# Patient Record
Sex: FEMALE | Race: BLACK OR AFRICAN AMERICAN | Employment: STUDENT | URBAN - METROPOLITAN AREA
[De-identification: names, ages, dates, MRNs, and addresses within clinical notes are randomized per-mention and may not be internally consistent; named-entity substitution may affect disease eponyms.]

---

## 2019-07-01 ENCOUNTER — OFFICE VISIT (OUTPATIENT)
Dept: OBGYN CLINIC | Age: 29
End: 2019-07-01

## 2019-07-01 ENCOUNTER — TELEPHONE (OUTPATIENT)
Dept: OBGYN CLINIC | Age: 29
End: 2019-07-01

## 2019-07-01 VITALS
RESPIRATION RATE: 20 BRPM | BODY MASS INDEX: 23.4 KG/M2 | SYSTOLIC BLOOD PRESSURE: 121 MMHG | OXYGEN SATURATION: 100 % | WEIGHT: 154.4 LBS | HEART RATE: 94 BPM | TEMPERATURE: 98 F | DIASTOLIC BLOOD PRESSURE: 81 MMHG | HEIGHT: 68 IN

## 2019-07-01 DIAGNOSIS — Z30.09 FAMILY PLANNING: Primary | ICD-10-CM

## 2019-07-01 LAB
HCG URINE, QL. (POC): NEGATIVE
VALID INTERNAL CONTROL?: YES

## 2019-07-01 RX ORDER — MEDROXYPROGESTERONE ACETATE 150 MG/ML
150 INJECTION, SUSPENSION INTRAMUSCULAR ONCE
Qty: 1 SYRINGE | Refills: 1
Start: 2019-07-01 | End: 2019-07-01

## 2019-07-01 RX ORDER — MEDROXYPROGESTERONE ACETATE 150 MG/ML
150 INJECTION, SUSPENSION INTRAMUSCULAR ONCE
Qty: 1 SYRINGE | Refills: 1
Start: 2019-07-01 | End: 2019-07-01 | Stop reason: SDUPTHER

## 2019-07-01 NOTE — PROGRESS NOTES
1. Have you been to the ER, urgent care clinic since your last visit? Hospitalized since your last visit? No    2. Have you seen or consulted any other health care providers outside of the [address] [place] [address] since your last visit? Include any pap smears or colon screening.  No

## 2019-07-01 NOTE — PROGRESS NOTES
Subjective:   Xiomy Johnson is a 25 y.o. female who presents for contraception counseling. The patient has no complaints today. She was previously on the Nexplanon and reports significant mood swings. She had the device removed and was placed on OCPs. She reports continued mood swings on the pills and would like to switch to depo provera. She was on depo provera approximately 1 year ago, but got pregnant on the medication. She reports skipping one month in between the time when she was supposed to have her next injection and receiving her injection adding an additional month (4 months) in between her doses instead of the 3 recommended. There is no problem list on file for this patient. There are no active problems to display for this patient. Current Outpatient Medications   Medication Sig Dispense Refill    medroxyPROGESTERone (DEPO-PROVERA) 150 mg/mL syrg 1 mL by IntraMUSCular route once for 1 dose. 1 Syringe 1    PRENATAL VITS W-CA,FE,FA,<1MG, (PRENATAL VITAMIN PO) Take  by mouth. Allergies   Allergen Reactions    Sulfa (Sulfonamide Antibiotics) Unknown (comments)     History reviewed. No pertinent past medical history. Past Surgical History:   Procedure Laterality Date    HX  SECTION       History reviewed. No pertinent family history. Social History     Tobacco Use    Smoking status: Never Smoker    Smokeless tobacco: Never Used   Substance Use Topics    Alcohol use: No        GYN Review: normal menses, no abnormal bleeding, pelvic pain or discharge, no breast pain or new or enlarging lumps on self exam    Additional Review of Systems  Pertinent items are noted in HPI. Objective:     Visit Vitals  /81   Pulse 94   Temp 98 °F (36.7 °C) (Oral)   Resp 20   Ht 5' 8\" (1.727 m)   Wt 154 lb 6.4 oz (70 kg)   LMP 2019   SpO2 100%   BMI 23.48 kg/m²     The patient appears well, alert, oriented x 3, in no distress.     Assessment/Plan:     25y.o. year old, starting Depo-Provera injections, no contraindications. counseled on family planning choices. Patient also counseled on the correct instructions for how to take the medication and that it is NOT advised to extend her dosages beyond the 3 months that are recommended. She was advised that this will increase her risk of getting pregnant and that she should NOT come up with her own regimen for any form of contraception to which she expressed understanding. ICD-10-CM ICD-9-CM    1. Family planning Z30.09 V25.09 AMB POC URINE PREGNANCY TEST, VISUAL COLOR COMPARISON      medroxyPROGESTERone (DEPO-PROVERA) 150 mg/mL syrg     Encounter Diagnoses   Name Primary?  Family planning Yes     Orders Placed This Encounter    AMB POC URINE PREGNANCY TEST, VISUAL COLOR COMPARISON    medroxyPROGESTERone (DEPO-PROVERA) 150 mg/mL syrg     Follow-up and Dispositions    · Return in about 4 months (around 11/1/2019) for Annual Exam or prn. The entire visit with Ms. Endy Carranza took 20 minutes. Over 50% of this visit was spent counseling the patient regarding her concerns. All questions were answered to her satisfaction.  She will follow-up in November for her Annual Exam.

## 2019-07-01 NOTE — PROGRESS NOTES
Patient  Here to receive Medroxy-progesterone 150 mg/ml    Verbal order obtained from [facility]   Medication check witness by Danisha Smith LPN,  2 identifiers checked  by both nurses   Visit Vitals  /81   Pulse 94   Temp 98 °F (36.7 °C) (Oral)   Resp 20   Ht 5' 8\" (1.727 m)   Wt 154 lb 6.4 oz (70 kg)   LMP 07/01/2019   SpO2 100%   BMI 23.48 kg/m²     Urine pregnancy test negative Depo  Depo Provera  150 mg /1 mL   Was place in the Right Gluteus   LOT # [id number] expires 04/01/21  Patient observed for 15 minutes and discharge home in stable condition  Discharge instructions given to follow up in office in 90 days for her next injection

## 2019-10-10 ENCOUNTER — CLINICAL SUPPORT (OUTPATIENT)
Dept: OBGYN CLINIC | Age: 29
End: 2019-10-10

## 2019-10-10 VITALS
BODY MASS INDEX: 23.64 KG/M2 | OXYGEN SATURATION: 99 % | SYSTOLIC BLOOD PRESSURE: 111 MMHG | DIASTOLIC BLOOD PRESSURE: 78 MMHG | HEART RATE: 80 BPM | RESPIRATION RATE: 20 BRPM | HEIGHT: 68 IN | WEIGHT: 156 LBS

## 2019-10-10 DIAGNOSIS — Z30.09 FAMILY PLANNING: Primary | ICD-10-CM

## 2019-10-10 LAB
HCG URINE, QL. (POC): NEGATIVE
VALID INTERNAL CONTROL?: YES

## 2019-10-10 RX ORDER — MEDROXYPROGESTERONE ACETATE 150 MG/ML
150 INJECTION, SUSPENSION INTRAMUSCULAR ONCE
Qty: 1 SYRINGE | Refills: 0
Start: 2019-10-10 | End: 2019-10-10

## 2019-10-10 NOTE — PROGRESS NOTES
Date last pap: is due ASAP patient awareLast Depo-Provera: 07/01/19  Side Effects if any: none. Serum HCG indicated? UPT NEGATIVE. Depo-Provera 150 mg IM given by: Alejandro garcia  Next appointment due 12/2019.

## 2020-01-24 ENCOUNTER — HOSPITAL ENCOUNTER (OUTPATIENT)
Dept: LAB | Age: 30
Discharge: HOME | End: 2020-01-24
Payer: COMMERCIAL

## 2020-01-24 ENCOUNTER — OFFICE VISIT (OUTPATIENT)
Dept: OBGYN CLINIC | Age: 30
End: 2020-01-24

## 2020-01-24 VITALS
DIASTOLIC BLOOD PRESSURE: 77 MMHG | RESPIRATION RATE: 18 BRPM | HEIGHT: 68 IN | WEIGHT: 145 LBS | HEART RATE: 98 BPM | BODY MASS INDEX: 21.98 KG/M2 | SYSTOLIC BLOOD PRESSURE: 106 MMHG

## 2020-01-24 DIAGNOSIS — Z11.3 ROUTINE SCREENING FOR STI (SEXUALLY TRANSMITTED INFECTION): ICD-10-CM

## 2020-01-24 DIAGNOSIS — Z01.419 WELL WOMAN EXAM WITH ROUTINE GYNECOLOGICAL EXAM: ICD-10-CM

## 2020-01-24 DIAGNOSIS — Z30.09 FAMILY PLANNING: Primary | ICD-10-CM

## 2020-01-24 LAB
HCG URINE, QL. (POC): NEGATIVE
VALID INTERNAL CONTROL?: YES

## 2020-01-24 PROCEDURE — 87624 HPV HI-RISK TYP POOLED RSLT: CPT

## 2020-01-24 PROCEDURE — 87661 TRICHOMONAS VAGINALIS AMPLIF: CPT

## 2020-01-24 PROCEDURE — 87389 HIV-1 AG W/HIV-1&-2 AB AG IA: CPT

## 2020-01-24 PROCEDURE — 88175 CYTOPATH C/V AUTO FLUID REDO: CPT

## 2020-01-24 PROCEDURE — 86780 TREPONEMA PALLIDUM: CPT

## 2020-01-24 PROCEDURE — 36415 COLL VENOUS BLD VENIPUNCTURE: CPT

## 2020-01-24 PROCEDURE — 86803 HEPATITIS C AB TEST: CPT

## 2020-01-24 PROCEDURE — 87340 HEPATITIS B SURFACE AG IA: CPT

## 2020-01-24 RX ORDER — MEDROXYPROGESTERONE ACETATE 150 MG/ML
150 INJECTION, SUSPENSION INTRAMUSCULAR ONCE
COMMUNITY
End: 2020-01-24 | Stop reason: SDUPTHER

## 2020-01-24 RX ORDER — MEDROXYPROGESTERONE ACETATE 150 MG/ML
150 INJECTION, SUSPENSION INTRAMUSCULAR ONCE
Qty: 1 SYRINGE | Refills: 3
Start: 2020-01-24 | End: 2020-01-24

## 2020-01-24 NOTE — PROGRESS NOTES
Subjective:   30 y.o. female for Well Woman Check. She desires to continue depo provera for contraception. No LMP recorded. Social History: single partner, contraception - Depo-Provera injections. Pertinent past medical hstory: no history of HTN, DVT, CAD, DM, liver disease, migraines or smoking. There is no problem list on file for this patient. There are no active problems to display for this patient. Current Outpatient Medications   Medication Sig Dispense Refill    medroxyPROGESTERone (DEPO-PROVERA) 150 mg/mL syrg 1 mL by IntraMUSCular route once for 1 dose. 1 Syringe 3    PRENATAL VITS W-CA,FE,FA,<1MG, (PRENATAL VITAMIN PO) Take  by mouth. Allergies   Allergen Reactions    Sulfa (Sulfonamide Antibiotics) Unknown (comments)     History reviewed. No pertinent past medical history. Past Surgical History:   Procedure Laterality Date    HX  SECTION       History reviewed. No pertinent family history. Social History     Tobacco Use    Smoking status: Never Smoker    Smokeless tobacco: Never Used   Substance Use Topics    Alcohol use: No        ROS:  Feeling well. No dyspnea or chest pain on exertion. No abdominal pain, change in bowel habits, black or bloody stools. No urinary tract symptoms. GYN ROS: no breast pain or new or enlarging lumps on self exam. No neurological complaints. Objective:     Visit Vitals  /77   Pulse 98   Resp 18   Ht 5' 8\" (1.727 m)   Wt 145 lb (65.8 kg)   BMI 22.05 kg/m²     The patient appears well, alert, oriented x 3, in no distress. ENT normal.  Neck supple. No adenopathy or thyromegaly. HUI. Lungs are clear, good air entry, no wheezes, rhonchi or rales. S1 and S2 normal, no murmurs, regular rate and rhythm. Abdomen soft without tenderness, guarding, mass or organomegaly. Extremities show no edema, normal peripheral pulses. Neurological is normal, no focal findings.     BREAST EXAM: breasts appear normal, no suspicious masses, no skin or nipple changes or axillary nodes    PELVIC EXAM: normal external genitalia, vulva, vagina, cervix, uterus and adnexa    Assessment/Plan:   well woman  no contraindication to continue hormonal therapy  pap smear  counseled on breast self exam  return annually or prn    ICD-10-CM ICD-9-CM    1. Well woman exam with routine gynecological exam Z01.419 V72.31 medroxyPROGESTERone (DEPO-PROVERA) 150 mg/mL syrg      T PALLIDUM AB      HEP B SURFACE AG      HEPATITIS C AB      HIV 1/2 AG/AB, 4TH GENERATION,W RFLX CONFIRM      PAP IG, CT-NG TV HPV 16&18,45 ([id number], [id number])   2. Routine screening for STI (sexually transmitted infection) Z11.3 V74.5 medroxyPROGESTERone (DEPO-PROVERA) 150 mg/mL syrg      T PALLIDUM AB      HEP B SURFACE AG      HEPATITIS C AB      HIV 1/2 AG/AB, 4TH GENERATION,W RFLX CONFIRM      PAP IG, CT-NG TV HPV 16&18,45 ([id number], [id number])   3. Family planning Z30.09 V25.09 medroxyPROGESTERone (DEPO-PROVERA) 150 mg/mL syrg     Encounter Diagnoses   Name Primary?  Well woman exam with routine gynecological exam Yes    Routine screening for STI (sexually transmitted infection)     Family planning      Orders Placed This Encounter    T PALLIDUM AB    HEP B SURFACE AG    HEPATITIS C AB    HIV 1/2 AG/AB, 4TH GENERATION,W RFLX CONFIRM    DISCONTD: medroxyPROGESTERone (DEPO-PROVERA) 150 mg/mL syrg    medroxyPROGESTERone (DEPO-PROVERA) 150 mg/mL syrg    PAP IG, CT-NG TV HPV 16&18,45 ([id number], [id number])     Follow-up and Dispositions    · Return in about 1 year (around 1/24/2021), or if symptoms worsen or fail to improve, for Annual Exam or prn. Joey Mckeon

## 2020-01-24 NOTE — PROGRESS NOTES
Patient  Here to receive Medroxy-progesterone 150 mg/ml    Verbal order obtained from ,  Medication check witness by oscar haines LPN,  2 identifiers checked  by both nurses   Visit Vitals  /77   Pulse 98   Resp 18   Ht 5' 8\" (1.727 m)   Wt 145 lb (65.8 kg)   BMI 22.05 kg/m²     Urine pregnancy test negative Depo  Depo Provera  150 mg /1 mL   Was place in the  Right gluteus   LOT #  [id number] expires 12/01/21  Patient observed for 15 minutes and discharge home in stable condition  Discharge instructions given to follow up in office in 90 days for her next injection

## 2020-01-27 LAB
C TRACH RRNA SPEC QL NAA+PROBE: NEGATIVE
HBV SURFACE AG SER QL: <0.1 INDEX
HBV SURFACE AG SER QL: NEGATIVE
HCV AB SER IA-ACNC: 0.04 INDEX
HCV AB SERPL QL IA: NEGATIVE
HCV COMMENT,HCGAC: NORMAL
HIV 1+2 AB+HIV1 P24 AG SERPL QL IA: NONREACTIVE
HIV12 RESULT COMMENT, HHIVC: NORMAL
N GONORRHOEA RRNA SPEC QL NAA+PROBE: NEGATIVE
SPECIMEN SOURCE: NORMAL
T PALLIDUM AB SER QL IA: NONREACTIVE
T VAGINALIS RRNA SPEC QL NAA+PROBE: NEGATIVE

## 2020-04-23 ENCOUNTER — CLINICAL SUPPORT (OUTPATIENT)
Dept: OBGYN CLINIC | Age: 30
End: 2020-04-23

## 2020-04-23 VITALS
BODY MASS INDEX: 24.25 KG/M2 | WEIGHT: 160 LBS | HEART RATE: 90 BPM | HEIGHT: 68 IN | SYSTOLIC BLOOD PRESSURE: 124 MMHG | DIASTOLIC BLOOD PRESSURE: 87 MMHG

## 2020-04-23 DIAGNOSIS — Z30.09 FAMILY PLANNING: Primary | ICD-10-CM

## 2020-04-23 LAB
HCG URINE, QL. (POC): NEGATIVE
VALID INTERNAL CONTROL?: YES

## 2020-04-23 NOTE — PROGRESS NOTES
Patient  Here to receive Medroxy-progesterone 150 mg/ml    Verbal order obtained from [facility]  Medication check witness by Ghassan Leslie,  2 identifiers checked  by both nurses   Urine pregnancy test negative Depo  Depo Provera  150 mg /1 mL   Was place in the   Right gluteus   LOT # [id number] expires 02/01/22  Patient observed for 15 minutes and discharge home in stable condition  Discharge instructions given to follow up in office in 90 days for her next injection           Visit Vitals  /87   Pulse 90   Ht 5' 8\" (1.727 m)   Wt 160 lb (72.6 kg)   BMI 24.33 kg/m²

## 2020-07-23 ENCOUNTER — CLINICAL SUPPORT (OUTPATIENT)
Dept: OBGYN CLINIC | Age: 30
End: 2020-07-23

## 2020-07-23 VITALS
DIASTOLIC BLOOD PRESSURE: 76 MMHG | BODY MASS INDEX: 26.52 KG/M2 | HEIGHT: 68 IN | HEART RATE: 105 BPM | WEIGHT: 175 LBS | SYSTOLIC BLOOD PRESSURE: 132 MMHG

## 2020-07-23 DIAGNOSIS — Z30.09 FAMILY PLANNING: Primary | ICD-10-CM

## 2020-07-23 LAB
HCG URINE, QL. (POC): NEGATIVE
VALID INTERNAL CONTROL?: YES

## 2020-07-23 NOTE — PROGRESS NOTES
Patient  here today to her receive Medroxy-progesterone 150 mg/ml    Medication  Double checked and witness by Frances  2 identifiers checked  by both team members  Visit Vitals  /76   Pulse (!) 105   Ht 5' 8\" (1.727 m)   Wt 175 lb (79.4 kg)   BMI 26.61 kg/m²     Urine pregnancy test negative and charted . Depo Provera  150 mg /1 mL   Was place in the *    LOT # [id number] expires 04/01/22      Patient observed for 15 minutes and discharge home in stable condition. Discharge instructions given to follow up in office in 90 days for her next injection .

## 2020-07-27 ENCOUNTER — VIRTUAL VISIT (OUTPATIENT)
Dept: FAMILY MEDICINE CLINIC | Age: 30
End: 2020-07-27

## 2020-07-27 ENCOUNTER — TELEPHONE (OUTPATIENT)
Dept: FAMILY MEDICINE CLINIC | Age: 30
End: 2020-07-27

## 2020-07-27 DIAGNOSIS — F41.9 ANXIETY: Primary | ICD-10-CM

## 2020-07-27 RX ORDER — BUSPIRONE HYDROCHLORIDE 5 MG/1
5 TABLET ORAL
Qty: 90 TAB | Refills: 1 | Status: SHIPPED | OUTPATIENT
Start: 2020-07-27 | End: 2020-08-10

## 2020-07-27 RX ORDER — MEDROXYPROGESTERONE ACETATE 150 MG/ML
INJECTION, SUSPENSION INTRAMUSCULAR
COMMUNITY
Start: 2020-07-23

## 2020-07-27 NOTE — TELEPHONE ENCOUNTER
Patient is requesting to be off work until the [employer] on 07/30. Patient is requesting a note to cover her from 07/27-07/30. Call the patient back at [phone].

## 2020-07-27 NOTE — PATIENT INSTRUCTIONS
Learning About Mindfulness for Stress What are mindfulness and stress? Stress is what you feel when you have to handle more than you are used to. A lot of things can cause stress. You may feel stress when you go on a job interview, take a test, or run a race. This kind of short-term stress is normal and even useful. It can help you if you need to work hard or react quickly. Stress also can last a long time. Long-term stress is caused by stressful situations or events. Examples of long-term stress include long-term health problems, ongoing problems at work, and conflicts in your family. Long-term stress can harm your health. Mindfulness is a focus only on things happening in the present moment. It's a process of purposefully paying attention to and being aware of your surroundings, your emotions, your thoughts, and how your body feels. You are aware of these things, but you aren't judging these experiences as \"good\" or \"bad. \" Mindfulness can help you learn to calm your mind and body to help you cope with illness, pain, and stress. How does mindfulness help to relieve stress? Mindfulness can help quiet your mind and relax your body. Studies show that it can help some people sleep better, feel less anxious, and bring their blood pressure down. And it's been shown to help some people live and cope better with certain health problems like heart disease, depression, chronic pain, and cancer. How do you practice mindfulness? To be mindful is to pay attention, to be present, and to be accepting. · When you're mindful, you do just one thing and you pay close attention to that one thing. For example, you may sit quietly and notice your emotions or how your food tastes and smells. · When you're present, you focus on the things that are happening right now. You let go of your thoughts about the past and the future.  When you dwell on the past or the future, you miss moments that can heal and strengthen you. You may miss moments like hearing a child laugh or seeing a friendly face when you think you're all alone. · When you're accepting, you don't  the present moment. Instead you accept your thoughts and feelings as they come. You can practice anytime, anywhere, and in any way you choose. You can practice in many ways. Here are a few ideas: · While doing your chores, like washing the dishes, let your mind focus on what's in your hand. What does the dish feel like? Is the water warm or cold? · Go outside and take a few deep breaths. What is the air like? Is it warm or cold? · When you can, take some time at the start of your day to sit alone and think. · Take a slow walk by yourself. Count your steps while you breathe in and out. · Try yoga breathing exercises, stretches, and poses to strengthen and relax your muscles. · At work, if you can, try to stop for a few moments each hour. Note how your body feels. Let yourself regroup and let your mind settle before you return to what you were doing. · If you struggle with anxiety or \"worry thoughts,\" imagine your mind as a blue cm and your worry thoughts as clouds. Now imagine those worry thoughts floating across your mind's cm. Just let them pass by as you watch. Follow-up care is a key part of your treatment and safety. Be sure to make and go to all appointments, and call your doctor if you are having problems. It's also a good idea to know your test results and keep a list of the medicines you take. Where can you learn more? Go to http://prabhakar-ivette.info/ Enter V887 in the search box to learn more about \"Learning About Mindfulness for Stress. \" Current as of: December 16, 2019               Content Version: 12.5 © 9442-2535 [employer]wise[employer]orporated. Care instructions adapted under license by [employer] (which disclaims liability or warranty for this information).  If you have questions about a medical condition or this instruction, always ask your healthcare professional. [place] [address] any warranty or liability for your use of this information.

## 2020-07-27 NOTE — PROGRESS NOTES
Chief Complaint   Patient presents with    New Patient    Shortness of Breath     taking frequent breaks     Other     Excessive sweating            1. Have you been to the ER, urgent care clinic since your last visit? Hospitalized since your last visit? No    2. Have you seen or consulted any other health care providers outside of the [address] [place] [address] since your last visit? Include any pap smears or colon screening.  NO
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
## 2020-07-31 ENCOUNTER — TELEPHONE (OUTPATIENT)
Dept: FAMILY MEDICINE CLINIC | Age: 30
End: 2020-07-31

## 2020-07-31 NOTE — TELEPHONE ENCOUNTER
Patient called back again wanting to speak with the nurse regarding how her medication is making her feel

## 2020-08-03 ENCOUNTER — TELEPHONE (OUTPATIENT)
Dept: PRIMARY CARE CLINIC | Age: 30
End: 2020-08-03

## 2020-08-03 DIAGNOSIS — F43.10 PTSD (POST-TRAUMATIC STRESS DISORDER): ICD-10-CM

## 2020-08-03 DIAGNOSIS — F41.9 ANXIETY: Primary | ICD-10-CM

## 2020-08-03 RX ORDER — PRAZOSIN HYDROCHLORIDE 1 MG/1
1 CAPSULE ORAL
Qty: 30 CAP | Refills: 0 | Status: SHIPPED | OUTPATIENT
Start: 2020-08-03 | End: 2020-09-10
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
## 2020-08-03 NOTE — LETTER
NOTIFICATION RETURN TO WORK / SCHOOL 
 
8/3/2020 5:10 PM 
 
Ms. Juan Almonte [facility] [phone] To Whom It May Concern: 
 
Juan Almonte is currently under the care of Fiona Estes. She will return to work/school on: 8/8/2020. If there are questions or concerns please have the patient contact our office. Sincerely, Jake Hussein NP

## 2020-08-03 NOTE — TELEPHONE ENCOUNTER
Pt returned my call in regards medication is not making her feel well. She states that BELGICA Perez prescribed her a medication last week and is making her feel nauseous and is keeping her up at night. She is not sleeping well. She also states she has a claim for disability from work as well and is needing a work note- I asked her why is she on disability and pt stated that it is interfering with her performance at work. I informed  Her that all I can do is ask the provider what she would like to do now. Pt can be reached at [phone]. Thank you      Did talk to BELGICA Perez in this regard- she will call pt after her appt's this evening. I did inform pt of this. Pt understood.  Thanks

## 2020-08-04 ENCOUNTER — TELEPHONE (OUTPATIENT)
Dept: FAMILY MEDICINE CLINIC | Age: 30
End: 2020-08-04

## 2020-08-05 NOTE — TELEPHONE ENCOUNTER
Did call pt back and inform her that they are not taking new patients at this time- gave her a few other options to work with.  Thank you

## 2020-08-10 ENCOUNTER — TELEPHONE (OUTPATIENT)
Dept: PRIMARY CARE CLINIC | Age: 30
End: 2020-08-10

## 2020-08-10 ENCOUNTER — TELEPHONE (OUTPATIENT)
Dept: FAMILY MEDICINE CLINIC | Age: 30
End: 2020-08-10

## 2020-08-10 NOTE — TELEPHONE ENCOUNTER
Did notify pt, that PER NP Ace Blackmon will NOT fill out disability paperwork for pt to be out of work. She only saw pt once and the paperwork needs to be filled out by psychiatry. All this was PER NP Ace Blackmon- pt understood.  Thank you

## 2020-08-10 NOTE — TELEPHONE ENCOUNTER
The onset of the prazosin is 2 hours, she can try to take it earlier. If it is making her too sleepy in the day she can stop taking it and just wait for her psychiatry visit.

## 2020-08-10 NOTE — TELEPHONE ENCOUNTER
Patient called to state that her sleep medication is working during the daytime and that she as an appointment with a psychologist on 8/17/2020

## 2020-08-10 NOTE — TELEPHONE ENCOUNTER
Just got off the phone with this pt, she did not mention anything to me in this regard. About her sleep medication.

## 2020-08-11 LAB
ALB/GLOBRATIO, 58C: 1.6 (CALC) (ref 1–2.5)
ALBUMIN SERPL-MCNC: 4.7 G/DL (ref 3.6–5.1)
ALKALINE PHOSPHATASE, TOTAL, 25002000: 60 U/L (ref 31–125)
ALT SERPL-CCNC: 9 U/L (ref 6–29)
AST SERPL W P-5'-P-CCNC: 13 U/L (ref 10–30)
BILIRUB SERPL-MCNC: 0.4 MG/DL (ref 0.2–1.2)
BUN SERPL-MCNC: 9 MG/DL (ref 7–25)
BUN/CREATININE RATIO,BUCR: NORMAL (CALC) (ref 6–22)
CALCIUM SERPL-MCNC: 10.2 MG/DL (ref 8.6–10.2)
CHLORIDE SERPL-SCNC: 104 MMOL/L (ref 98–110)
CO2 SERPL-SCNC: 26 MMOL/L (ref 20–32)
CREAT SERPL-MCNC: 0.65 MG/DL (ref 0.5–1.1)
GLOBULIN,GLOB: 3 G/DL (CALC) (ref 1.9–3.7)
GLUCOSE SERPL-MCNC: 85 MG/DL (ref 65–99)
POTASSIUM SERPL-SCNC: 4.3 MMOL/L (ref 3.5–5.3)
PROT SERPL-MCNC: 7.7 G/DL (ref 6.1–8.1)
SODIUM SERPL-SCNC: 138 MMOL/L (ref 135–146)
T4 FREE SERPL-MCNC: 1.2 NG/DL (ref 0.8–1.8)
TSH SERPL DL<=0.005 MIU/L-ACNC: 0.75 MIU/L

## 2020-09-10 ENCOUNTER — VIRTUAL VISIT (OUTPATIENT)
Dept: FAMILY MEDICINE CLINIC | Age: 30
End: 2020-09-10

## 2020-09-10 DIAGNOSIS — F41.9 ANXIETY: Primary | ICD-10-CM

## 2020-09-10 RX ORDER — BUSPIRONE HYDROCHLORIDE 5 MG/1
5 TABLET ORAL
COMMUNITY
End: 2020-09-10

## 2020-09-10 RX ORDER — HYDROXYZINE PAMOATE 25 MG/1
CAPSULE ORAL
COMMUNITY
Start: 2020-09-09

## 2020-09-10 RX ORDER — BUPROPION HYDROCHLORIDE 150 MG/1
TABLET ORAL
COMMUNITY
Start: 2020-08-17

## 2020-09-10 RX ORDER — LURASIDONE HYDROCHLORIDE 20 MG/1
TABLET, FILM COATED ORAL
COMMUNITY
Start: 2020-09-08 | End: 2020-09-10

## 2020-09-10 RX ORDER — TRAZODONE HYDROCHLORIDE 50 MG/1
TABLET ORAL
COMMUNITY
Start: 2020-08-03 | End: 2020-09-10

## 2020-09-10 NOTE — PROGRESS NOTES
[address] [place] [address]      Myrtle Scruggs is a 30 y.o. female who was seen by synchronous (real-time) audio-video technology on 9/10/2020. Consent: Myrtle Scruggs, who was seen by synchronous (real-time) audio-video technology, and/or her healthcare decision maker, is aware that this patient-initiated, Telehealth encounter on 9/10/2020 is a billable service, with coverage as determined by her insurance carrier. She is aware that she may receive a bill and has provided verbal consent to proceed: Yes. Assessment & Plan:   Diagnoses and all orders for this visit:    1. Anxiety    paperwork completed for the dates of 7/23-8/8/2020 and faxed to the number provided    Follow-up and Dispositions    · Return if symptoms worsen or fail to improve. 712  Subjective:   Myrtle Scruggs is a 30 y.o. female who was seen for   Other (Paperwork FMLA)    [address] paperwork filled out  Was referred to [facility] and could not get an appointment  She is going to [place], being seen by SAMARA Daigle  She had medication changes and was placed out of work until next f/u 10/6/2020  Her work wants more information as to why she was out of work and returning on 8/8/2020    Prior to Admission medications    Medication Sig Start Date End Date Taking? Authorizing Provider   buPROPion XL (WELLBUTRIN XL) 150 mg tablet  8/17/20  Yes Provider, Historical   medroxyPROGESTERone (DEPO-PROVERA) 150 mg/mL injection  7/23/20  Yes Provider, Historical   hydrOXYzine pamoate (VISTARIL) 25 mg capsule  9/9/20   Provider, Historical   Latuda 20 mg tab tablet  9/8/20 9/10/20  Provider, Historical   traZODone (DESYREL) 50 mg tablet TK 1 TO 2 TS PO HS 8/3/20 9/10/20  Provider, Historical   busPIRone (BUSPAR) 5 mg tablet Take 5 mg by mouth. 9/10/20  Provider, Historical   prazosin (MINIPRESS) 1 mg capsule Take 1 Cap by mouth nightly.  8/3/20 9/10/20  [facility] L, NP   PRENATAL VITS W-CA,FE,FA,<1MG, (PRENATAL VITAMIN PO) Take  by mouth. Other, MD Alejandro     Allergies   Allergen Reactions    Sulfa (Sulfonamide Antibiotics) Unknown (comments)       Patient Active Problem List   Diagnosis Code    Anxiety F41.9     Past Medical History:   Diagnosis Date    ADHD     Anxiety     Depression     Psychotic disorder ([facility] [address]      Past Surgical History:   Procedure Laterality Date    HX  SECTION       Family History   Problem Relation Age of Onset    No Known Problems Mother     No Known Problems Father      Social History     Tobacco Use    Smoking status: Former Smoker     Packs/day: 0.50     Years: 4.00     Pack years: 2.00    Smokeless tobacco: Never Used   Substance Use Topics    Alcohol use: No       ROS  As stated in HPI, otherwise all others negative. Objective: There were no vitals taken for this visit. General: alert, cooperative, no distress   Mental  status: normal mood, behavior, speech, dress, motor activity, and thought processes, able to follow commands   HENT: NCAT   Neck: no visualized mass   Resp: no respiratory distress   Neuro: no gross deficits   Skin: no discoloration or lesions of concern on visible areas   Psychiatric: normal affect, consistent with stated mood, no evidence of hallucinations     Additional exam findings: We discussed the expected course, resolution and complications of the diagnosis(es) in detail. Medication risks, benefits, costs, interactions, and alternatives were discussed as indicated. I advised her to contact the office if her condition worsens, changes or fails to improve as anticipated. She expressed understanding with the diagnosis(es) and plan. Jonathan Bernal is a 30 y.o. female who was evaluated by a video visit encounter for concerns as above. Patient identification was verified prior to start of the visit. A caregiver was present when appropriate.  Due to this being a TeleHealth encounter (During RLPZH-00 public health emergency), evaluation of the following organ systems was limited: Vitals/Constitutional/EENT/Resp/CV/GI//MS/Neuro/Skin/Heme-Lymph-Imm. Pursuant to the emergency declaration under the [facility], [facility] waiver authority and the [employer] Act, this Virtual  Visit was conducted, with patient's (and/or legal guardian's) consent, to reduce the patient's risk of exposure to COVID-19 and provide necessary medical care. Services were provided through a video synchronous discussion virtually to substitute for in-person clinic visit. Patient and provider were located at their individual homes. An After Visit Summary was printed and given to the patient. All diagnosis have been discussed with the patient and all of the patient's questions have been answered. Follow-up and Dispositions    · Return if symptoms worsen or fail to improve. Manuel Diggs, [facility] [id number] [address]   Lalo Gold

## 2020-09-10 NOTE — PROGRESS NOTES
Chief Complaint   Patient presents with    Other     Paperwork FMLA       1. Have you been to the ER, urgent care clinic since your last visit? Hospitalized since your last visit? NO    2. Have you seen or consulted any other health care providers outside of the [address] [place] [address] since your last visit? Include any pap smears or colon screening.  Psychiatry

## 2022-03-18 PROBLEM — F41.9 ANXIETY: Status: ACTIVE | Noted: 2020-07-27

## 2023-08-04 ENCOUNTER — HOSPITAL ENCOUNTER (OUTPATIENT)
Facility: HOSPITAL | Age: 33
Setting detail: SPECIMEN
End: 2023-08-04
Payer: COMMERCIAL

## 2023-08-04 ENCOUNTER — OFFICE VISIT (OUTPATIENT)
Facility: CLINIC | Age: 33
End: 2023-08-04
Payer: COMMERCIAL

## 2023-08-04 VITALS
WEIGHT: 189 LBS | SYSTOLIC BLOOD PRESSURE: 104 MMHG | RESPIRATION RATE: 18 BRPM | DIASTOLIC BLOOD PRESSURE: 70 MMHG | TEMPERATURE: 98.2 F | HEIGHT: 67 IN | HEART RATE: 89 BPM | BODY MASS INDEX: 29.66 KG/M2 | OXYGEN SATURATION: 96 %

## 2023-08-04 DIAGNOSIS — M54.50 CHRONIC MIDLINE LOW BACK PAIN WITHOUT SCIATICA: ICD-10-CM

## 2023-08-04 DIAGNOSIS — Z11.3 SCREEN FOR STD (SEXUALLY TRANSMITTED DISEASE): ICD-10-CM

## 2023-08-04 DIAGNOSIS — F34.1 DYSTHYMIA: ICD-10-CM

## 2023-08-04 DIAGNOSIS — Z00.00 WELL WOMAN EXAM (NO GYNECOLOGICAL EXAM): ICD-10-CM

## 2023-08-04 DIAGNOSIS — Z00.00 WELL WOMAN EXAM (NO GYNECOLOGICAL EXAM): Primary | ICD-10-CM

## 2023-08-04 DIAGNOSIS — G89.29 CHRONIC MIDLINE LOW BACK PAIN WITHOUT SCIATICA: ICD-10-CM

## 2023-08-04 LAB
ALBUMIN SERPL-MCNC: 3.5 G/DL (ref 3.4–5)
ALBUMIN/GLOB SERPL: 0.9 (ref 0.8–1.7)
ALP SERPL-CCNC: 62 U/L (ref 45–117)
ALT SERPL-CCNC: 14 U/L (ref 13–56)
ANION GAP SERPL CALC-SCNC: 5 MMOL/L (ref 3–18)
AST SERPL-CCNC: 6 U/L (ref 10–38)
BILIRUB SERPL-MCNC: 0.4 MG/DL (ref 0.2–1)
BUN SERPL-MCNC: 10 MG/DL (ref 7–18)
BUN/CREAT SERPL: 14 (ref 12–20)
CALCIUM SERPL-MCNC: 8.9 MG/DL (ref 8.5–10.1)
CHLORIDE SERPL-SCNC: 107 MMOL/L (ref 100–111)
CO2 SERPL-SCNC: 26 MMOL/L (ref 21–32)
CREAT SERPL-MCNC: 0.74 MG/DL (ref 0.6–1.3)
GLOBULIN SER CALC-MCNC: 3.8 G/DL (ref 2–4)
GLUCOSE SERPL-MCNC: 72 MG/DL (ref 74–99)
POTASSIUM SERPL-SCNC: 4 MMOL/L (ref 3.5–5.5)
PROT SERPL-MCNC: 7.3 G/DL (ref 6.4–8.2)
SODIUM SERPL-SCNC: 138 MMOL/L (ref 136–145)
T4 FREE SERPL-MCNC: 1 NG/DL (ref 0.7–1.5)
TSH SERPL DL<=0.05 MIU/L-ACNC: 0.45 UIU/ML (ref 0.36–3.74)

## 2023-08-04 PROCEDURE — 87591 N.GONORRHOEAE DNA AMP PROB: CPT

## 2023-08-04 PROCEDURE — 36415 COLL VENOUS BLD VENIPUNCTURE: CPT

## 2023-08-04 PROCEDURE — 84443 ASSAY THYROID STIM HORMONE: CPT

## 2023-08-04 PROCEDURE — 87491 CHLMYD TRACH DNA AMP PROBE: CPT

## 2023-08-04 PROCEDURE — 80053 COMPREHEN METABOLIC PANEL: CPT

## 2023-08-04 PROCEDURE — 87661 TRICHOMONAS VAGINALIS AMPLIF: CPT

## 2023-08-04 PROCEDURE — 84439 ASSAY OF FREE THYROXINE: CPT

## 2023-08-04 PROCEDURE — 99395 PREV VISIT EST AGE 18-39: CPT | Performed by: NURSE PRACTITIONER

## 2023-08-04 RX ORDER — MEDROXYPROGESTERONE ACETATE 150 MG/ML
INJECTION, SUSPENSION INTRAMUSCULAR
COMMUNITY
Start: 2023-06-08

## 2023-08-04 ASSESSMENT — ENCOUNTER SYMPTOMS
RESPIRATORY NEGATIVE: 1
BACK PAIN: 1
EYES NEGATIVE: 1
GASTROINTESTINAL NEGATIVE: 1

## 2023-08-04 ASSESSMENT — PATIENT HEALTH QUESTIONNAIRE - PHQ9
SUM OF ALL RESPONSES TO PHQ QUESTIONS 1-9: 0
2. FEELING DOWN, DEPRESSED OR HOPELESS: 0
SUM OF ALL RESPONSES TO PHQ QUESTIONS 1-9: 0
1. LITTLE INTEREST OR PLEASURE IN DOING THINGS: 0
SUM OF ALL RESPONSES TO PHQ9 QUESTIONS 1 & 2: 0

## 2023-08-04 NOTE — PROGRESS NOTES
[facility], [address] [place] [address]               [phone]      Yaritza Ozuna is a 27 y.o. female and presents with Annual Exam       Assessment/Plan:    1. Well woman exam (no gynecological exam)  -     Comprehensive Metabolic Panel; Future  2. Chronic midline low back pain without sciatica  3. Screen for STD (sexually transmitted disease)  -     Chlamydia, Gonorrhea, Trichomoniasis; Future  4. Dysthymia  -     TSH; Future  -     T4, Free; Future     Annual physical completed today    STD screening per patient request    Check thyroid as possible reversible cause of her dysthymia    Patient verbalized understanding and is in agreement with this plan of care    Follow up and disposition:   Return in about 1 year (around 8/4/2024) for CPE, w/anatoliy, 30min, office. Subjective:    Labs obtained prior to visit? No  Reviewed with patient? N/A    Visit for annual physical    ROS:     Review of Systems   Constitutional: Negative. HENT: Negative. Eyes: Negative. Respiratory: Negative. Cardiovascular: Negative. Gastrointestinal: Negative. Genitourinary: Negative. Musculoskeletal:  Positive for back pain. Skin:  Positive for rash. Hyperpigmentation under breasts along with dry skin, changed soaps, noticed it is a bit better   Neurological: Negative. Psychiatric/Behavioral:  Positive for dysphoric mood. 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 Patient Active Problem List   Diagnosis    Anxiety       The PSH, FH were reviewed.       SH:  Social History     Tobacco Use    Smoking

## 2023-08-04 NOTE — PROGRESS NOTES
Room 7    Kenya Chacon had concerns including Annual Exam. for today's visit . When asked if patient has any concerns she would like to address with MCKINLEY Guerrero . MCKINLEY Guerrero has been notified  of patient concerns . Did patient bring someone? No     Did the patient have DME equipment? No     Did you take your medication today? Patient does not take any medications      1. \"Have you been to the ER, urgent care clinic since your last visit? Hospitalized since your last visit? \" . NO    2. \"Have you seen or consulted any other health care providers outside of the [address] [place] [address] since your last visit? \" No     3. For patients aged 43-73: Has the patient had a colonoscopy / FIT/ Cologuard? N/A      If the patient is female:    4. For patients aged 43-66: Has the patient had a mammogram within the past 2 years? N//A      5. For patients aged 21-65: Has the patient had a pap smear? {Cancer Care Gap present? Patient states I had a pap at [facility] . When asked if patient menstrual cycle is normal patient states no due to Birth Control       PHQ-9  8/4/2023   Little interest or pleasure in doing things 0   Feeling down, depressed, or hopeless 0   PHQ-2 Score 0   PHQ-9 Total Score 0          Health Maintenance Due   Topic Date Due    COVID-19 Vaccine (1) Never done    Varicella vaccine (1 of 2 - 2-dose childhood series) Never done    Depression Screen  Never done    Flu vaccine (1) Never done         No question data found.

## 2023-08-07 ENCOUNTER — PATIENT MESSAGE (OUTPATIENT)
Facility: CLINIC | Age: 33
End: 2023-08-07

## 2023-08-07 LAB
C TRACH RRNA SPEC QL NAA+PROBE: NEGATIVE
N GONORRHOEA RRNA SPEC QL NAA+PROBE: NEGATIVE
SPECIMEN SOURCE: NORMAL

## 2023-08-11 LAB
SPECIMEN SOURCE: NORMAL
T VAGINALIS RRNA SPEC QL NAA+PROBE: NEGATIVE

## 2023-08-15 ENCOUNTER — TELEPHONE (OUTPATIENT)
Facility: CLINIC | Age: 33
End: 2023-08-15

## 2023-08-16 NOTE — TELEPHONE ENCOUNTER
From: Yaritza Ozuna  To: Luan Peng  Sent: 8/7/2023 12:39 PM EDT  Subject: Question regarding COMPREHENSIVE METABOLIC PANEL    Should I be concerned about my AST level?